# Patient Record
Sex: MALE | Race: WHITE
[De-identification: names, ages, dates, MRNs, and addresses within clinical notes are randomized per-mention and may not be internally consistent; named-entity substitution may affect disease eponyms.]

---

## 2019-01-28 ENCOUNTER — HOSPITAL ENCOUNTER (OUTPATIENT)
Dept: HOSPITAL 74 - JASU-SURG | Age: 44
Discharge: HOME | End: 2019-01-28
Attending: UROLOGY
Payer: COMMERCIAL

## 2019-01-28 VITALS — TEMPERATURE: 97.8 F | HEART RATE: 72 BPM

## 2019-01-28 VITALS — BODY MASS INDEX: 28.4 KG/M2

## 2019-01-28 VITALS — DIASTOLIC BLOOD PRESSURE: 89 MMHG | SYSTOLIC BLOOD PRESSURE: 136 MMHG

## 2019-01-28 DIAGNOSIS — N20.0: Primary | ICD-10-CM

## 2019-01-28 PROCEDURE — 0TF4XZZ FRAGMENTATION IN LEFT KIDNEY PELVIS, EXTERNAL APPROACH: ICD-10-PCS | Performed by: UROLOGY

## 2019-01-28 NOTE — OP
DATE OF OPERATION:  01/28/2019

 

PREOPERATIVE DIAGNOSIS:  Left renal stone.

 

POSTOPERATIVE DIAGNOSIS:  Left renal stone.

 

PROCEDURE:  Left extracorporeal shock wave lithotripsy.

 

ATTENDING:  Karo Esparza MD

 

ANESTHESIA:  Fractional.

 

DESCRIPTION OF OPERATION:  Patient was brought in the operating room, placed in

supine position on the operating room table.  Ultrasonography and fluoroscopy were

performed.  A 7-mm left mid-pole stone was identified.  At this point, anesthesia was

administered and preoperative antibiotics given.  The patient then underwent shock

wave lithotripsy; 2500 impulses at 17 joules of power were administered to the stone

under real-time ultrasonography and fluoroscopy.  Excellent fragmentation of the

stone was noted.  There were no complications noted.  The disposition of the patient

was to the recovery room.

 

 

KRAO ESPARZA M.D. [id number]

DD: 01/28/2019 18:51

DT: 01/28/2019 20:05

Job #:  [id number]

## 2019-01-28 NOTE — OP
Operative Note





- Note:


Operative Date: 01/28/19


Pre-Operative Diagnosis: Left renal stone


Operation: Left ESWL


Implants: 7 mm mid pole Left renal stone


Post-Operative Diagnosis: Same as Pre-op


Surgeon: Mika Goldman


Anesthesia: Fractional


Estimated Blood Loss (mls): 0


Operative Report Dictated: Yes